# Patient Record
Sex: FEMALE | Race: OTHER
[De-identification: names, ages, dates, MRNs, and addresses within clinical notes are randomized per-mention and may not be internally consistent; named-entity substitution may affect disease eponyms.]

---

## 2019-03-19 PROBLEM — Z00.00 ENCOUNTER FOR PREVENTIVE HEALTH EXAMINATION: Status: ACTIVE | Noted: 2019-03-19

## 2019-03-21 ENCOUNTER — APPOINTMENT (OUTPATIENT)
Dept: ENDOCRINOLOGY | Facility: CLINIC | Age: 29
End: 2019-03-21
Payer: MEDICAID

## 2019-03-21 VITALS — WEIGHT: 206 LBS | SYSTOLIC BLOOD PRESSURE: 110 MMHG | DIASTOLIC BLOOD PRESSURE: 54 MMHG | HEART RATE: 85 BPM

## 2019-03-21 DIAGNOSIS — Z78.9 OTHER SPECIFIED HEALTH STATUS: ICD-10-CM

## 2019-03-21 DIAGNOSIS — B19.10 UNSPECIFIED VIRAL HEPATITIS B W/OUT HEPATIC COMA: ICD-10-CM

## 2019-03-21 DIAGNOSIS — Z83.3 FAMILY HISTORY OF DIABETES MELLITUS: ICD-10-CM

## 2019-03-21 LAB — GLUCOSE BLDC GLUCOMTR-MCNC: 85

## 2019-03-21 PROCEDURE — 99203 OFFICE O/P NEW LOW 30 MIN: CPT | Mod: 25

## 2019-03-21 PROCEDURE — 82962 GLUCOSE BLOOD TEST: CPT

## 2019-03-21 NOTE — PHYSICAL EXAM
[Alert] : alert [No Acute Distress] : no acute distress [Well Nourished] : well nourished [Well Developed] : well developed [Normal Sclera/Conjunctiva] : normal sclera/conjunctiva [EOMI] : extra ocular movement intact [No Proptosis] : no proptosis [Normal Oropharynx] : the oropharynx was normal [Thyroid Not Enlarged] : the thyroid was not enlarged [No Thyroid Nodules] : there were no palpable thyroid nodules [No Respiratory Distress] : no respiratory distress [No Accessory Muscle Use] : no accessory muscle use [Clear to Auscultation] : lungs were clear to auscultation bilaterally [Normal Rate] : heart rate was normal  [Normal S1, S2] : normal S1 and S2 [Regular Rhythm] : with a regular rhythm [No Edema] : there was no peripheral edema [Spine Straight] : spine straight [Oriented x3] : oriented to person, place, and time [Normal Affect] : the affect was normal [Normal Mood] : the mood was normal [Acanthosis Nigricans] : no acanthosis nigricans [de-identified] : Gravid

## 2019-03-21 NOTE — REVIEW OF SYSTEMS
[Recent Weight Gain (___ Lbs)] : recent [unfilled] ~Ulb weight gain [Polyuria] : polyuria [Polydipsia] : polydipsia [Blurry Vision] : no blurred vision [Chest Pain] : no chest pain [Palpitations] : no palpitations [Shortness Of Breath] : no shortness of breath [Nausea] : no nausea [Vomiting] : no vomiting was observed [Abdominal Pain] : no abdominal pain [Pain/Numbness of Digits] : no pain/numbness of digits

## 2019-03-21 NOTE — HISTORY OF PRESENT ILLNESS
[FreeTextEntry1] : Referred for consultation for gestational diabetes by Dr Post. Had 1 hour GTT on 3/18/19: 196\par Currently 30 weeks pregnant with a boy, CONNIE 2019\par , one miscarriage at ~10 weeks\par No GDM in first pregnancy. Baby girl was 8 pounds at birth,  born vaginally without complication. No hypoglycemia in baby, no NICU.  x 2 months.\par Current BG 85, fasting.\par PMH significant for Hepatitis B and D. \par FH of diabetes in mother.\par Patient's primary languages are Nepali and Syriac, but she said she is comfortable using English for today's appointment. Encouraged patient to let me know if she wants to use a .

## 2019-03-21 NOTE — CONSULT LETTER
[Dear  ___] : Dear  [unfilled], [Consult Letter:] : I had the pleasure of evaluating your patient, [unfilled]. [Consult Closing:] : Thank you very much for allowing me to participate in the care of this patient.  If you have any questions, please do not hesitate to contact me. [Sincerely,] : Sincerely, [FreeTextEntry1] : As you know, she is approximately 30 weeks gestation and was referred to us for gestational diabetes management.\par \par At our visit, I explained impaired glucose tolerance, briefly reviewed the pathophysiology of gestational diabetes, and discussed the risks of gestational diabetes. Unfortunately, the patient refused to stay in the office after her consultation with myself to meet with our certified diabetic educator and registered dietician to review the gestational diabetic diet in depth and learn how to use the glucometer. Given the results of the one hour glucose tolerance test, I believe the patient will likely need insulin to keep the blood glucose within our target range of <90 fasting or <120 one hour post prandially. \par \par I encouraged the patient to return to our office on Tuesday for an appointment with the CDE/RD where we plan to educate her on the use of a glucometer, the gestational diabetic diet, and insulin therapy. If and when we start insulin therapy, I will be sure to let your office know.  [FreeTextEntry3] : \par Rea Almaraz, DIMAS-BC

## 2019-03-21 NOTE — ASSESSMENT
[FreeTextEntry1] : Briefly reviewed pathophysiology of GDM and risks of uncontrolled BG during pregnancy to patient and fetus. Patient refusing to stay for GDM class to learn GDM diet and SMBG- states she has to leave to get home to her daughter. Will attend GDM class with CDE/RD on Tuesday. Will educate patient on use of insulin at class given likely need for insulin therapy due to results of GTT. Check A1c and TSH.

## 2019-03-28 ENCOUNTER — LABORATORY RESULT (OUTPATIENT)
Age: 29
End: 2019-03-28

## 2019-03-28 ENCOUNTER — APPOINTMENT (OUTPATIENT)
Dept: ENDOCRINOLOGY | Facility: CLINIC | Age: 29
End: 2019-03-28
Payer: MEDICAID

## 2019-03-28 DIAGNOSIS — Z86.19 PERSONAL HISTORY OF OTHER INFECTIOUS AND PARASITIC DISEASES: ICD-10-CM

## 2019-03-28 DIAGNOSIS — Z56.0 UNEMPLOYMENT, UNSPECIFIED: ICD-10-CM

## 2019-03-28 PROCEDURE — G0108 DIAB MANAGE TRN  PER INDIV: CPT

## 2019-03-28 RX ORDER — URINE ACETONE TEST STRIPS
STRIP MISCELLANEOUS
Qty: 1 | Refills: 1 | Status: ACTIVE | COMMUNITY
Start: 2019-03-28 | End: 1900-01-01

## 2019-03-28 SDOH — ECONOMIC STABILITY - INCOME SECURITY: UNEMPLOYMENT, UNSPECIFIED: Z56.0

## 2019-04-02 ENCOUNTER — APPOINTMENT (OUTPATIENT)
Dept: ENDOCRINOLOGY | Facility: CLINIC | Age: 29
End: 2019-04-02

## 2019-04-12 ENCOUNTER — OTHER (OUTPATIENT)
Age: 29
End: 2019-04-12

## 2019-04-12 ENCOUNTER — APPOINTMENT (OUTPATIENT)
Dept: ENDOCRINOLOGY | Facility: CLINIC | Age: 29
End: 2019-04-12

## 2019-04-19 ENCOUNTER — APPOINTMENT (OUTPATIENT)
Dept: ENDOCRINOLOGY | Facility: CLINIC | Age: 29
End: 2019-04-19
Payer: MEDICAID

## 2019-04-19 VITALS
HEIGHT: 67 IN | DIASTOLIC BLOOD PRESSURE: 62 MMHG | BODY MASS INDEX: 32.33 KG/M2 | WEIGHT: 206 LBS | HEART RATE: 97 BPM | SYSTOLIC BLOOD PRESSURE: 100 MMHG | OXYGEN SATURATION: 98 %

## 2019-04-19 LAB — GLUCOSE BLDC GLUCOMTR-MCNC: 98

## 2019-04-19 PROCEDURE — 82962 GLUCOSE BLOOD TEST: CPT

## 2019-04-19 PROCEDURE — 99214 OFFICE O/P EST MOD 30 MIN: CPT | Mod: 25

## 2019-04-19 RX ORDER — PNV NO.95/FERROUS FUM/FOLIC AC 28MG-0.8MG
TABLET ORAL
Refills: 0 | Status: ACTIVE | COMMUNITY

## 2019-04-19 RX ORDER — BLOOD-GLUCOSE METER
KIT MISCELLANEOUS
Qty: 1 | Refills: 0 | Status: ACTIVE | COMMUNITY
Start: 2019-04-19 | End: 1900-01-01

## 2019-04-19 RX ORDER — LANCETS 28 GAUGE
EACH MISCELLANEOUS
Qty: 2 | Refills: 0 | Status: ACTIVE | COMMUNITY
Start: 2019-04-19 | End: 1900-01-01

## 2019-04-19 RX ORDER — BLOOD SUGAR DIAGNOSTIC
STRIP MISCELLANEOUS 4 TIMES DAILY
Qty: 4 | Refills: 0 | Status: ACTIVE | COMMUNITY
Start: 2019-04-19 | End: 1900-01-01

## 2019-04-19 NOTE — HISTORY OF PRESENT ILLNESS
[FreeTextEntry1] : Follow-up GDM. Has not followed up in this office since initial visit. No glucose logs, no meter.\par Currently 35 weeks pregnant with a boy, CONNIE 2019\par , one miscarriage at ~10 weeks\par No GDM in first pregnancy. Baby girl was 8 pounds at birth,  born vaginally without complication. No hypoglycemia in baby, no NICU. \par  x 2 months.\par PMH significant for Hepatitis B and D. \par FH of diabetes in mother.\par \par Current BG 98, fasting\par Tested BG for a few days with sample strips, but then stopped when she ran out. Reports test strips were too expensive. Never contacted our office.\par When she was testing, reports fasting BG was high 90s to low 100s. Post prandial BG was 110s.\par Reports she has been following GDM diet since appointment with CDE/RD.\par \par Patient's primary languages are Nauruan and Faroese, but she said she is comfortable using English for today's appointment. Asked if patient wanted a , and she refused. Encouraged patient to let me know if she wants to use a .

## 2019-04-19 NOTE — PHYSICAL EXAM
[No Acute Distress] : no acute distress [Alert] : alert [Well Nourished] : well nourished [Well Developed] : well developed [EOMI] : extra ocular movement intact [Normal Sclera/Conjunctiva] : normal sclera/conjunctiva [No Proptosis] : no proptosis [Thyroid Not Enlarged] : the thyroid was not enlarged [Normal Oropharynx] : the oropharynx was normal [No Thyroid Nodules] : there were no palpable thyroid nodules [No Respiratory Distress] : no respiratory distress [No Accessory Muscle Use] : no accessory muscle use [Clear to Auscultation] : lungs were clear to auscultation bilaterally [Normal Rate] : heart rate was normal  [Normal S1, S2] : normal S1 and S2 [Regular Rhythm] : with a regular rhythm [No Edema] : there was no peripheral edema [Spine Straight] : spine straight [Oriented x3] : oriented to person, place, and time [Normal Affect] : the affect was normal [Normal Mood] : the mood was normal [de-identified] : Gravid [Acanthosis Nigricans] : no acanthosis nigricans

## 2019-04-19 NOTE — ASSESSMENT
[Action and use of Insulin] : action and use of short and long-acting insulin [Insulin Self-Administration] : insulin self-administration [Injection Technique, Storage, Sharps Disposal] : injection technique, storage, and sharps disposal [FreeTextEntry1] : 28 year old female, currently 35 weeks pregnant with GDM. Given A1c 6.0% and reports of elevated fasting BG, will start Humulin N 4 units daily at bedtime. Educated on use of insulin pens. Return demonstration complete. Reviewed insulin action time, insulin storage, and signs/symptoms/treatment of hypoglycemia. RX sent for new Freestyle meter with strips and lancets. Patient will call office if she has an issue at the pharmacy. Reviewed risks of gestational diabetes and importance of glycemic control. Must send in glucose logs early next week, by Monday or Tuesday, for insulin adjustment.

## 2019-04-19 NOTE — REVIEW OF SYSTEMS
[Polyuria] : polyuria [Polydipsia] : polydipsia [Recent Weight Gain (___ Lbs)] : recent [unfilled] ~Ulb weight gain [Recent Weight Loss (___ Lbs)] : recent [unfilled] ~Ulb weight loss [Blurry Vision] : blurred vision [Chest Pain] : no chest pain [Shortness Of Breath] : no shortness of breath [Palpitations] : no palpitations [Nausea] : no nausea [Vomiting] : no vomiting was observed [Abdominal Pain] : no abdominal pain [Pain/Numbness of Digits] : no pain/numbness of digits [FreeTextEntry3] : feels vision get blurry when she eats something sweet

## 2019-05-07 ENCOUNTER — APPOINTMENT (OUTPATIENT)
Dept: ENDOCRINOLOGY | Facility: CLINIC | Age: 29
End: 2019-05-07
Payer: MEDICAID

## 2019-05-07 VITALS
HEIGHT: 67 IN | HEART RATE: 96 BPM | SYSTOLIC BLOOD PRESSURE: 102 MMHG | WEIGHT: 205 LBS | DIASTOLIC BLOOD PRESSURE: 60 MMHG | BODY MASS INDEX: 32.18 KG/M2

## 2019-05-07 LAB — GLUCOSE BLDC GLUCOMTR-MCNC: 129

## 2019-05-07 PROCEDURE — 99213 OFFICE O/P EST LOW 20 MIN: CPT | Mod: 25

## 2019-05-07 PROCEDURE — 82962 GLUCOSE BLOOD TEST: CPT

## 2019-05-07 RX ORDER — INSULIN HUMAN 100 [IU]/ML
100 INJECTION, SUSPENSION SUBCUTANEOUS AT BEDTIME
Qty: 1 | Refills: 0 | Status: ACTIVE | COMMUNITY
Start: 2019-04-19

## 2019-05-07 RX ORDER — PEN NEEDLE, DIABETIC 29 G X1/2"
32G X 4 MM NEEDLE, DISPOSABLE MISCELLANEOUS
Qty: 100 | Refills: 0 | Status: ACTIVE | COMMUNITY
Start: 2019-04-19 | End: 1900-01-01

## 2019-05-07 NOTE — PHYSICAL EXAM
[No Acute Distress] : no acute distress [Alert] : alert [Well Nourished] : well nourished [Normal Sclera/Conjunctiva] : normal sclera/conjunctiva [Well Developed] : well developed [Supple] : the neck was supple [EOMI] : extra ocular movement intact [Normal Hearing] : hearing was normal [No LAD] : no lymphadenopathy [Thyroid Not Enlarged] : the thyroid was not enlarged [No Thyroid Nodules] : there were no palpable thyroid nodules [Normal Rate and Effort] : normal respiratory rhythm and effort [No Accessory Muscle Use] : no accessory muscle use [Clear to Auscultation] : lungs were clear to auscultation bilaterally [Normal S1, S2] : normal S1 and S2 [Normal Rate] : heart rate was normal  [Regular Rhythm] : with a regular rhythm [No Edema] : there was no peripheral edema [Normal Gait] : normal gait [No Rash] : no rash [Acanthosis Nigricans] : no acanthosis nigricans [No Motor Deficits] : the motor exam was normal [No Tremors] : no tremors [Oriented x3] : oriented to person, place, and time [Normal Insight/Judgement] : insight and judgment were intact [Normal Mood] : the mood was normal

## 2019-05-07 NOTE — ASSESSMENT
[FreeTextEntry1] : 29 y/o female with GDM. Labs from 3/28/19 - A1C 6.0%, TSH 1.07, Free T3 2.7, and Free T4 1.0\par \par Plan:\par GDM: \par -start Humalog 3 units before breakfast and before dinner\par - increase Humulin N to 6 units at HS\par - continue self blood sugar monitoring 4 times a day\par - send in logs in 3 days\par - continue to monitor urine ketones\par - follow up visit in 1 week for close follow up \par

## 2019-05-07 NOTE — REVIEW OF SYSTEMS
[Fatigue] : fatigue [Polyuria] : polyuria [Polydipsia] : polydipsia [Decreased Appetite] : appetite not decreased [Recent Weight Gain (___ Lbs)] : no recent weight gain [Recent Weight Loss (___ Lbs)] : no recent weight loss [Visual Field Defect] : no visual field defect [Blurry Vision] : no blurred vision [Dysphagia] : no dysphagia [Dysphonia] : no dysphonia [Neck Pain] : no neck pain [Chest Pain] : no chest pain [Palpitations] : no palpitations [Constipation] : no constipation [Diarrhea] : no diarrhea [Dysuria] : no dysuria [Headache] : no headaches [Depression] : no depression [Tremors] : no tremors [Anxiety] : no anxiety [Cold Intolerance] : cold tolerant [Heat Intolerance] : heat tolerant [Easy Bruising] : no tendency for easy bruising [Swelling] : no swelling [FreeTextEntry2] : weight stable

## 2019-05-07 NOTE — HISTORY OF PRESENT ILLNESS
[FreeTextEntry1] : Quality: GDM\par Severity: moderate\par Onset: 30 weeks gestation\par Glucose tolerance test results: 1 hr gtt 196, \par History of PCOS: No\par Previous history of GDM: No\par Family History: Mother with prediabetes\par Modifying Factors:\par \par Current Medication Regimen:\par Humulin N 4 units at HS\par \par Self blood sugar monitorin times a day, per logs \par last week blood sugars\par before breakfast: 89, 86, 84, 99, 87, 92, 104\par after breakfast: 108, 162, 135, 104, 133\par after lunch: 155, 114, 114 limited data \par after dinner: 123, 140, 136, 158\par No blood sugars recorded for this week\par Pt. reports blood sugar number are increasing at breakfast\par \par Notes: Obstetrician: Dr. Post\par \par EDC: 19\par  36 weeks gestation\par Last sonogram: last week - (girl) 7 lbs, 7 oz, "everything is good"\par \par Planning to breast feed: Yes\par

## 2019-05-10 ENCOUNTER — APPOINTMENT (OUTPATIENT)
Dept: OBGYN | Facility: CLINIC | Age: 29
End: 2019-05-10

## 2019-05-10 ENCOUNTER — MEDICATION RENEWAL (OUTPATIENT)
Age: 29
End: 2019-05-10

## 2019-05-10 RX ORDER — INSULIN LISPRO 100 [IU]/ML
100 INJECTION, SOLUTION INTRAVENOUS; SUBCUTANEOUS
Qty: 1 | Refills: 1 | Status: ACTIVE | COMMUNITY
Start: 2019-05-07 | End: 1900-01-01

## 2019-05-15 ENCOUNTER — APPOINTMENT (OUTPATIENT)
Dept: ENDOCRINOLOGY | Facility: CLINIC | Age: 29
End: 2019-05-15

## 2019-05-16 ENCOUNTER — APPOINTMENT (OUTPATIENT)
Dept: ENDOCRINOLOGY | Facility: CLINIC | Age: 29
End: 2019-05-16
Payer: MEDICAID

## 2019-05-16 LAB — GLUCOSE BLDC GLUCOMTR-MCNC: 95

## 2019-05-16 PROCEDURE — G0108 DIAB MANAGE TRN  PER INDIV: CPT

## 2019-05-21 ENCOUNTER — APPOINTMENT (OUTPATIENT)
Dept: ENDOCRINOLOGY | Facility: CLINIC | Age: 29
End: 2019-05-21
Payer: MEDICAID

## 2019-05-21 DIAGNOSIS — O24.419 GESTATIONAL DIABETES MELLITUS IN PREGNANCY, UNSPECIFIED CONTROL: ICD-10-CM

## 2019-05-21 DIAGNOSIS — O24.414 GESTATIONAL DIABETES MELLITUS IN PREGNANCY, INSULIN CONTROLLED: ICD-10-CM

## 2019-05-21 PROCEDURE — G0108 DIAB MANAGE TRN  PER INDIV: CPT

## 2019-05-21 RX ORDER — LANCETS 33 GAUGE
EACH MISCELLANEOUS
Qty: 400 | Refills: 1 | Status: DISCONTINUED | COMMUNITY
Start: 2019-03-28 | End: 2019-05-21

## 2019-05-21 RX ORDER — BLOOD SUGAR DIAGNOSTIC
STRIP MISCELLANEOUS 4 TIMES DAILY
Qty: 400 | Refills: 1 | Status: DISCONTINUED | COMMUNITY
Start: 2019-03-28 | End: 2019-05-21

## 2021-11-17 ENCOUNTER — APPOINTMENT (OUTPATIENT)
Dept: OPHTHALMOLOGY | Facility: CLINIC | Age: 31
End: 2021-11-17

## 2022-10-10 ENCOUNTER — OFFICE (OUTPATIENT)
Dept: URBAN - METROPOLITAN AREA CLINIC 1 | Facility: CLINIC | Age: 32
Setting detail: OPHTHALMOLOGY
End: 2022-10-10
Payer: MEDICAID

## 2022-10-10 DIAGNOSIS — H11.153: ICD-10-CM

## 2022-10-10 DIAGNOSIS — H01.001: ICD-10-CM

## 2022-10-10 DIAGNOSIS — H01.002: ICD-10-CM

## 2022-10-10 DIAGNOSIS — H01.004: ICD-10-CM

## 2022-10-10 PROBLEM — H01.005 BLEPHARITIS; RIGHT UPPER LID, RIGHT LOWER LID, LEFT UPPER LID, LEFT LOWER LID: Status: ACTIVE | Noted: 2022-10-10

## 2022-10-10 PROCEDURE — 92014 COMPRE OPH EXAM EST PT 1/>: CPT | Performed by: OPHTHALMOLOGY

## 2022-10-10 ASSESSMENT — TONOMETRY
OS_IOP_MMHG: 10
OD_IOP_MMHG: 10

## 2022-10-10 ASSESSMENT — LID EXAM ASSESSMENTS
OD_BLEPHARITIS: RLL RUL
OS_BLEPHARITIS: LLL LUL

## 2022-10-10 ASSESSMENT — REFRACTION_MANIFEST
OD_CYLINDER: -0.25
OS_AXIS: 022
OS_SPHERE: PLANO
OS_CYLINDER: -0.25
OD_SPHERE: -0.25
OD_VA1: 20/20
OD_AXIS: 132
OS_VA1: 20/20

## 2022-10-10 ASSESSMENT — KERATOMETRY
OD_K1POWER_DIOPTERS: 41.75
OD_AXISANGLE_DEGREES: 069
OD_K2POWER_DIOPTERS: 42.00
OS_AXISANGLE_DEGREES: 101
OS_K1POWER_DIOPTERS: 41.75
OS_K2POWER_DIOPTERS: 42.25

## 2022-10-10 ASSESSMENT — REFRACTION_AUTOREFRACTION
OS_CYLINDER: -0.25
OD_AXIS: 132
OS_AXIS: 022
OD_CYLINDER: -0.25
OS_SPHERE: PLANO
OD_SPHERE: -0.25

## 2022-10-10 ASSESSMENT — VISUAL ACUITY
OD_BCVA: 20/20
OS_BCVA: 20/20

## 2022-10-10 ASSESSMENT — AXIALLENGTH_DERIVED
OD_AL: 24.359
OD_AL: 24.359

## 2022-10-10 ASSESSMENT — CONFRONTATIONAL VISUAL FIELD TEST (CVF)
OS_FINDINGS: FULL
OD_FINDINGS: FULL

## 2022-10-10 ASSESSMENT — SPHEQUIV_DERIVED
OD_SPHEQUIV: -0.375
OD_SPHEQUIV: -0.375